# Patient Record
Sex: FEMALE | URBAN - METROPOLITAN AREA
[De-identification: names, ages, dates, MRNs, and addresses within clinical notes are randomized per-mention and may not be internally consistent; named-entity substitution may affect disease eponyms.]

---

## 2021-11-03 ENCOUNTER — OFFICE VISIT (OUTPATIENT)
Dept: URBAN - METROPOLITAN AREA CLINIC 92 | Facility: CLINIC | Age: 46
End: 2021-11-03
Payer: COMMERCIAL

## 2021-11-03 DIAGNOSIS — H52.13 MYOPIA, BILATERAL: Primary | ICD-10-CM

## 2021-11-03 PROCEDURE — 92004 COMPRE OPH EXAM NEW PT 1/>: CPT | Performed by: OPTOMETRIST

## 2021-11-03 ASSESSMENT — VISUAL ACUITY
OS: 20/25
OD: 20/25

## 2021-11-03 ASSESSMENT — INTRAOCULAR PRESSURE
OS: 11
OD: 13

## 2021-11-03 ASSESSMENT — KERATOMETRY
OS: 45.25
OD: 45.50

## 2021-11-03 NOTE — IMPRESSION/PLAN
Impression: Myopia, bilateral: H52.13. Plan: Discussed diagnosis with patient. Dispensed new Spec Rx today for full time wear. Patient preference for distance only, will take off specs for near work.